# Patient Record
Sex: FEMALE | Race: BLACK OR AFRICAN AMERICAN | NOT HISPANIC OR LATINO | Employment: STUDENT | URBAN - METROPOLITAN AREA
[De-identification: names, ages, dates, MRNs, and addresses within clinical notes are randomized per-mention and may not be internally consistent; named-entity substitution may affect disease eponyms.]

---
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
## 2024-01-10 ENCOUNTER — TELEPHONE (OUTPATIENT)
Dept: OBSTETRICS AND GYNECOLOGY | Facility: CLINIC | Age: 19
End: 2024-01-10

## 2024-01-10 NOTE — TELEPHONE ENCOUNTER
Called patient in regards to scheduling NOB - Preg Confirmation. No answer. Left message on VM to return office call.

## 2024-01-10 NOTE — TELEPHONE ENCOUNTER
----- Message from Nicol Galo sent at 1/10/2024 12:33 PM CST -----  Contact: self  Pt needs to schedule INT OB appt pls call [phone] appt details

## 2024-01-11 ENCOUNTER — TELEPHONE (OUTPATIENT)
Dept: OBSTETRICS AND GYNECOLOGY | Facility: CLINIC | Age: 19
End: 2024-01-11

## 2024-01-11 NOTE — TELEPHONE ENCOUNTER
----- Message from Eliane Bryson sent at 1/11/2024  2:20 PM CST -----  Contact: willie  .Type:  Patient Returning Call    Who Called:willie  Who Left Message for Patient:dago  Does the patient know what this is regarding?:yes  Would the patient rather a call back or a response via [facility]? Call back  Best Call Back Number:[phone]   Additional Information: n/a      Thanks  DD

## 2024-01-11 NOTE — TELEPHONE ENCOUNTER
Called and spoke with patient about scheduling new ob appointment. Pt has moved back to [place] from [place]. She will be applying for medicaid. Pt adv Dr. Bragg is not currently accepting medicaid at this time. Pt offered to see Cleo Irene and Frank. Please schedule new ob appointment. Thanks! Ana Neal

## 2024-01-11 NOTE — TELEPHONE ENCOUNTER
----- Message from Mabel Johnson sent at 1/10/2024  4:12 PM CST -----  Name of Who is Calling:patient           What is the request in detail:returning missed call           Can the clinic reply by [facility]:no           What Number to Call Back if not in [facility]: [phone]

## 2024-01-31 DIAGNOSIS — Z34.91 ENCOUNTER FOR PREGNANCY RELATED EXAMINATION IN FIRST TRIMESTER: Primary | ICD-10-CM

## 2024-01-31 DIAGNOSIS — Z03.89 ENCOUNTER FOR OBSERVATION FOR OTHER SUSPECTED DISEASES AND CONDITIONS RULED OUT: ICD-10-CM

## 2024-02-07 DIAGNOSIS — Z34.91 ENCOUNTER FOR PREGNANCY RELATED EXAMINATION IN FIRST TRIMESTER: Primary | ICD-10-CM
